# Patient Record
Sex: FEMALE | Race: WHITE | NOT HISPANIC OR LATINO
[De-identification: names, ages, dates, MRNs, and addresses within clinical notes are randomized per-mention and may not be internally consistent; named-entity substitution may affect disease eponyms.]

---

## 2018-07-27 PROBLEM — Z00.00 ENCOUNTER FOR PREVENTIVE HEALTH EXAMINATION: Status: ACTIVE | Noted: 2018-07-27

## 2018-11-02 ENCOUNTER — RECORD ABSTRACTING (OUTPATIENT)
Age: 48
End: 2018-11-02

## 2018-11-02 RX ORDER — CALCIUM CARB/VITAMIN D3/VIT K1 650MG-12.5
500-500-40 TABLET,CHEWABLE ORAL
Refills: 0 | Status: ACTIVE | COMMUNITY

## 2018-11-05 ENCOUNTER — APPOINTMENT (OUTPATIENT)
Dept: ENDOCRINOLOGY | Facility: CLINIC | Age: 48
End: 2018-11-05

## 2019-01-22 ENCOUNTER — MEDICATION RENEWAL (OUTPATIENT)
Age: 49
End: 2019-01-22

## 2019-02-15 ENCOUNTER — CLINICAL ADVICE (OUTPATIENT)
Age: 49
End: 2019-02-15

## 2019-05-10 ENCOUNTER — RX RENEWAL (OUTPATIENT)
Age: 49
End: 2019-05-10

## 2019-06-03 ENCOUNTER — APPOINTMENT (OUTPATIENT)
Dept: ENDOCRINOLOGY | Facility: CLINIC | Age: 49
End: 2019-06-03
Payer: COMMERCIAL

## 2019-06-03 VITALS
DIASTOLIC BLOOD PRESSURE: 80 MMHG | HEART RATE: 65 BPM | SYSTOLIC BLOOD PRESSURE: 132 MMHG | HEIGHT: 60 IN | OXYGEN SATURATION: 98 %

## 2019-06-03 PROCEDURE — 99214 OFFICE O/P EST MOD 30 MIN: CPT

## 2019-06-06 DIAGNOSIS — Z78.0 ASYMPTOMATIC MENOPAUSAL STATE: ICD-10-CM

## 2019-06-06 DIAGNOSIS — Z13.820 ENCOUNTER FOR SCREENING FOR OSTEOPOROSIS: ICD-10-CM

## 2019-06-06 LAB
25(OH)D3 SERPL-MCNC: 19.4 NG/ML
ALBUMIN SERPL ELPH-MCNC: 4.6 G/DL
ALP BLD-CCNC: 92 U/L
ALT SERPL-CCNC: 22 U/L
ANION GAP SERPL CALC-SCNC: 16 MMOL/L
AST SERPL-CCNC: 22 U/L
BASOPHILS # BLD AUTO: 0.06 K/UL
BASOPHILS NFR BLD AUTO: 0.8 %
BILIRUB SERPL-MCNC: 0.4 MG/DL
BUN SERPL-MCNC: 20 MG/DL
CALCIUM SERPL-MCNC: 9.9 MG/DL
CHLORIDE SERPL-SCNC: 105 MMOL/L
CHOLEST SERPL-MCNC: 235 MG/DL
CHOLEST/HDLC SERPL: 3.3 RATIO
CO2 SERPL-SCNC: 21 MMOL/L
CORTIS SERPL-MCNC: 5.2 UG/DL
CREAT SERPL-MCNC: 1 MG/DL
EOSINOPHIL # BLD AUTO: 0.15 K/UL
EOSINOPHIL NFR BLD AUTO: 2 %
ESTIMATED AVERAGE GLUCOSE: 111 MG/DL
ESTRADIOL SERPL-MCNC: 23 PG/ML
FSH SERPL-MCNC: 104 IU/L
GH SERPL-MCNC: 0.07 NG/ML
GLUCOSE SERPL-MCNC: 82 MG/DL
HBA1C MFR BLD HPLC: 5.5 %
HCT VFR BLD CALC: 47.6 %
HDLC SERPL-MCNC: 71 MG/DL
HGB BLD-MCNC: 15.3 G/DL
IGF-1 INTERP: NORMAL
IGF-I BLD-MCNC: 153 NG/ML
IMM GRANULOCYTES NFR BLD AUTO: 0.3 %
LDLC SERPL CALC-MCNC: 149 MG/DL
LH SERPL-ACNC: 78.7 IU/L
LYMPHOCYTES # BLD AUTO: 2.34 K/UL
LYMPHOCYTES NFR BLD AUTO: 31.4 %
MAN DIFF?: NORMAL
MCHC RBC-ENTMCNC: 28.7 PG
MCHC RBC-ENTMCNC: 32.1 GM/DL
MCV RBC AUTO: 89.1 FL
MONOCYTES # BLD AUTO: 0.72 K/UL
MONOCYTES NFR BLD AUTO: 9.7 %
NEUTROPHILS # BLD AUTO: 4.16 K/UL
NEUTROPHILS NFR BLD AUTO: 55.8 %
PLATELET # BLD AUTO: 316 K/UL
POTASSIUM SERPL-SCNC: 4.5 MMOL/L
PROT SERPL-MCNC: 7.7 G/DL
RBC # BLD: 5.34 M/UL
RBC # FLD: 14.7 %
SHBG SERPL-SCNC: 40 NMOL/L
SODIUM SERPL-SCNC: 142 MMOL/L
T4 FREE SERPL-MCNC: 1.5 NG/DL
TRIGL SERPL-MCNC: 76 MG/DL
TSH SERPL-ACNC: 0.79 UIU/ML
VIT B12 SERPL-MCNC: 494 PG/ML
WBC # FLD AUTO: 7.45 K/UL

## 2019-06-07 LAB
17OHP SERPL-MCNC: 15 NG/DL
TESTOST BND SERPL-MCNC: 1.3 PG/ML
TESTOST SERPL-MCNC: 31.9 NG/DL

## 2019-06-07 NOTE — PHYSICAL EXAM
[No Acute Distress] : no acute distress [Alert] : alert [Normal Sclera/Conjunctiva] : normal sclera/conjunctiva [Well Nourished] : well nourished [Well Developed] : well developed [EOMI] : extra ocular movement intact [No Proptosis] : no proptosis [Thyroid Not Enlarged] : the thyroid was not enlarged [No Thyroid Nodules] : there were no palpable thyroid nodules [No Respiratory Distress] : no respiratory distress [No Accessory Muscle Use] : no accessory muscle use [Normal S1, S2] : normal S1 and S2 [Normal Rate] : heart rate was normal  [Clear to Auscultation] : lungs were clear to auscultation bilaterally [Pedal Pulses Normal] : the pedal pulses are present [Regular Rhythm] : with a regular rhythm [No Edema] : there was no peripheral edema [Anterior Cervical Nodes] : anterior cervical nodes [Post Cervical Nodes] : posterior cervical nodes [Normal] : normal and non tender [No Spinal Tenderness] : no spinal tenderness [Spine Straight] : spine straight [No Stigmata of Cushings Syndrome] : no stigmata of cushings syndrome [Normal Gait] : normal gait [Normal Strength/Tone] : muscle strength and tone were normal [No Rash] : no rash [Normal Reflexes] : deep tendon reflexes were 2+ and symmetric [Acanthosis Nigricans] : no acanthosis nigricans [No Tremors] : no tremors [Oriented x3] : oriented to person, place, and time

## 2019-06-07 NOTE — ASSESSMENT
[FreeTextEntry1] : Hypothryodism - will cont RX  check labs\par \par \par ?menopausal pt is s/p partial hysterecotmy \par  check LH FSH E2\par Pt with H/o PCOS\par  \par weight- resuemd exrecising \par s/p gstric sleeve in past \par \par pneumonia- to follow up with PMD

## 2019-06-07 NOTE — HISTORY OF PRESENT ILLNESS
[FreeTextEntry1] : Pt is here for follow up hypothryoidsm  and h/o PCOS \par  has been overall ok  but  a lot of family stress \par \par recently had pneumonia - to follow up with PMD for "spots on her lung" tomoroow \par \par HAs been having occaisonal hot flashes daily but not all day \par \par concrened about weight gain- started exercisign yesterday

## 2019-10-03 LAB — DHEA-SULFATE, SERUM: 234 UG/DL

## 2020-04-20 ENCOUNTER — APPOINTMENT (OUTPATIENT)
Dept: ENDOCRINOLOGY | Facility: CLINIC | Age: 50
End: 2020-04-20
Payer: COMMERCIAL

## 2020-04-20 PROCEDURE — 99213 OFFICE O/P EST LOW 20 MIN: CPT | Mod: 95

## 2020-04-26 NOTE — ASSESSMENT
[FreeTextEntry1] : Hypothryodism - will cont RX  check labs and sono \par \par \par ?menopausal pt is s/p partial hysterecotmy \par  check LH FSH E2\par Pt with H/o PCOS\par neverr id Dexa \par  \par weight- will go back to exercsising \par s/p gstric sleeve in past \par

## 2020-04-26 NOTE — REASON FOR VISIT
[Follow - Up] : a follow-up visit [Follow-Up: _____] : a [unfilled] follow-up visit [Hypothyroidism] : hypothyroidism

## 2020-04-26 NOTE — HISTORY OF PRESENT ILLNESS
[Home] : at home, [unfilled] , at the time of the visit. [Patient] : the patient [Other Location: e.g. Home (Enter Location, City,State)___] : at [unfilled] [FreeTextEntry1] : Esthela started 343 pm \par  time ended 355 pm \par Pt is here for follow up hypothryoidsm  and h/o PCOS \par  has been overall ok  but  a lot of family stress \par \par pt repotrs to be feeling overall ok \par  trying to stay healthy - in\par \par HAs been having occaisonal hot flashes daily but not all day \par \par  [Self] : self

## 2020-04-30 ENCOUNTER — RX RENEWAL (OUTPATIENT)
Age: 50
End: 2020-04-30

## 2020-10-19 ENCOUNTER — APPOINTMENT (OUTPATIENT)
Dept: ENDOCRINOLOGY | Facility: CLINIC | Age: 50
End: 2020-10-19
Payer: COMMERCIAL

## 2020-10-19 VITALS
OXYGEN SATURATION: 99 % | SYSTOLIC BLOOD PRESSURE: 120 MMHG | HEART RATE: 66 BPM | HEIGHT: 60 IN | DIASTOLIC BLOOD PRESSURE: 98 MMHG

## 2020-10-19 PROCEDURE — 99214 OFFICE O/P EST MOD 30 MIN: CPT | Mod: 25

## 2020-10-19 PROCEDURE — 99072 ADDL SUPL MATRL&STAF TM PHE: CPT

## 2020-10-25 NOTE — PHYSICAL EXAM
[Alert] : alert [Well Nourished] : well nourished [No Acute Distress] : no acute distress [Well Developed] : well developed [Normal Sclera/Conjunctiva] : normal sclera/conjunctiva [EOMI] : extra ocular movement intact [Normal Oropharynx] : the oropharynx was normal [Thyroid Not Enlarged] : the thyroid was not enlarged [No Thyroid Nodules] : no palpable thyroid nodules [No Respiratory Distress] : no respiratory distress [No Accessory Muscle Use] : no accessory muscle use [Clear to Auscultation] : lungs were clear to auscultation bilaterally [Normal S1, S2] : normal S1 and S2 [Normal Rate] : heart rate was normal [Regular Rhythm] : with a regular rhythm [No Edema] : no peripheral edema [Pedal Pulses Normal] : the pedal pulses are present [Normal Anterior Cervical Nodes] : no anterior cervical lymphadenopathy [Normal Posterior Cervical Nodes] : no posterior cervical lymphadenopathy [No Stigmata of Cushings Syndrome] : no stigmata of Cushings Syndrome [Normal Gait] : normal gait [Normal Strength/Tone] : muscle strength and tone were normal [No Rash] : no rash [Acanthosis Nigricans] : no acanthosis nigricans [Normal Reflexes] : deep tendon reflexes were 2+ and symmetric [No Tremors] : no tremors [Oriented x3] : oriented to person, place, and time

## 2020-10-25 NOTE — ASSESSMENT
[FreeTextEntry1] : Hypothryodism - will cont RX  check labs and sono \par \par \par menopausal pt is s/p partial hysterecotmy \par  check LH FSH E2\par Pt with H/o PCOS\par neverr id Dexa \par  \par + HTN - repeat 130/96 see PMD  pt had been havign some HA  in [place]   not smnoring a lot -  will call P/mD for followup \par \par

## 2020-10-25 NOTE — HISTORY OF PRESENT ILLNESS
[FreeTextEntry1] : \par Pt is here for follow up hypothryoidsm  and h/o PCOS \par  has been overall ok  but  a lot of family stress \par \par pt repotrs to be feeling overall ok \par  trying to stay healthy - in\par \par HAs been having occaisonal hot flashes daily but not all day   Pt has been trying to watch diet \par  but BP high today - ate salty  ffods  yesterday \par \par

## 2021-04-12 ENCOUNTER — APPOINTMENT (OUTPATIENT)
Dept: ENDOCRINOLOGY | Facility: CLINIC | Age: 51
End: 2021-04-12
Payer: COMMERCIAL

## 2021-04-12 VITALS
HEART RATE: 70 BPM | SYSTOLIC BLOOD PRESSURE: 130 MMHG | OXYGEN SATURATION: 99 % | DIASTOLIC BLOOD PRESSURE: 80 MMHG | HEIGHT: 60 IN

## 2021-04-12 PROCEDURE — 99213 OFFICE O/P EST LOW 20 MIN: CPT

## 2021-04-12 PROCEDURE — 99072 ADDL SUPL MATRL&STAF TM PHE: CPT

## 2021-04-19 NOTE — HISTORY OF PRESENT ILLNESS
[FreeTextEntry1] : \par Pt is here for follow up hypothryoidsm  and h/o PCOS \par \par pt repotrs to be feeling overall ok \par  trying to stay healthy - in\par \par HAs been having occaisonal hot flashes daily but not all day   Pt has been trying to watch diet \par  but BP high today - ate salty  ffods  yesterday \par \par

## 2021-04-19 NOTE — PHYSICAL EXAM
[Alert] : alert [Well Nourished] : well nourished [No Acute Distress] : no acute distress [Well Developed] : well developed [Normal Sclera/Conjunctiva] : normal sclera/conjunctiva [EOMI] : extra ocular movement intact [Normal Oropharynx] : the oropharynx was normal [Thyroid Not Enlarged] : the thyroid was not enlarged [No Thyroid Nodules] : no palpable thyroid nodules [No Respiratory Distress] : no respiratory distress [No Accessory Muscle Use] : no accessory muscle use [Clear to Auscultation] : lungs were clear to auscultation bilaterally [Normal S1, S2] : normal S1 and S2 [Normal Rate] : heart rate was normal [Regular Rhythm] : with a regular rhythm [No Edema] : no peripheral edema [Pedal Pulses Normal] : the pedal pulses are present [Normal Anterior Cervical Nodes] : no anterior cervical lymphadenopathy [No Stigmata of Cushings Syndrome] : no stigmata of Cushings Syndrome [Normal Gait] : normal gait [Normal Strength/Tone] : muscle strength and tone were normal [No Rash] : no rash [Acanthosis Nigricans] : no acanthosis nigricans [Normal Reflexes] : deep tendon reflexes were 2+ and symmetric [No Tremors] : no tremors [Oriented x3] : oriented to person, place, and time

## 2021-04-19 NOTE — ASSESSMENT
[FreeTextEntry1] : Hypothryodism - will cont RX  check labs and sono \par \par \par menopausal pt is s/p partial hysterecotmy \par  check LH FSH E2\par Pt with H/o PCOS\par \par \par BP better today on HCTZ

## 2021-05-14 ENCOUNTER — APPOINTMENT (OUTPATIENT)
Dept: ENDOCRINOLOGY | Facility: CLINIC | Age: 51
End: 2021-05-14

## 2021-05-19 ENCOUNTER — APPOINTMENT (OUTPATIENT)
Dept: ENDOCRINOLOGY | Facility: CLINIC | Age: 51
End: 2021-05-19
Payer: COMMERCIAL

## 2021-05-19 DIAGNOSIS — D50.8 OTHER IRON DEFICIENCY ANEMIAS: ICD-10-CM

## 2021-05-19 PROCEDURE — 36415 COLL VENOUS BLD VENIPUNCTURE: CPT

## 2021-05-19 PROCEDURE — 99072 ADDL SUPL MATRL&STAF TM PHE: CPT

## 2021-06-02 LAB
ANION GAP SERPL CALC-SCNC: 16 MMOL/L
BUN SERPL-MCNC: 16 MG/DL
CALCIUM SERPL-MCNC: 9.5 MG/DL
CALCIUM SERPL-MCNC: 9.8 MG/DL
CHLORIDE SERPL-SCNC: 106 MMOL/L
CO2 SERPL-SCNC: 20 MMOL/L
CORTIS SERPL-MCNC: 16.7 UG/DL
CREAT SERPL-MCNC: 0.83 MG/DL
GLUCOSE SERPL-MCNC: 96 MG/DL
PARATHYROID HORMONE INTACT: 45 PG/ML
POTASSIUM SERPL-SCNC: 4.6 MMOL/L
SODIUM SERPL-SCNC: 142 MMOL/L
T3FREE SERPL-MCNC: 2.71 PG/ML
T4 FREE SERPL-MCNC: 1.5 NG/DL
TSH SERPL-ACNC: 1.48 UIU/ML

## 2021-10-22 ENCOUNTER — APPOINTMENT (OUTPATIENT)
Dept: ENDOCRINOLOGY | Facility: CLINIC | Age: 51
End: 2021-10-22
Payer: COMMERCIAL

## 2021-10-22 VITALS
HEIGHT: 60 IN | HEART RATE: 79 BPM | SYSTOLIC BLOOD PRESSURE: 130 MMHG | WEIGHT: 221 LBS | BODY MASS INDEX: 43.39 KG/M2 | DIASTOLIC BLOOD PRESSURE: 80 MMHG

## 2021-10-22 PROCEDURE — 99214 OFFICE O/P EST MOD 30 MIN: CPT

## 2021-10-22 RX ORDER — BLOOD PRESSURE TEST KIT
KIT MISCELLANEOUS
Qty: 1 | Refills: 0 | Status: DISCONTINUED | COMMUNITY
Start: 2020-10-19 | End: 2021-10-22

## 2021-10-22 NOTE — ASSESSMENT
[FreeTextEntry1] : Hypothryodism - will cont RX  check labs and sono \par \par \par menopausal pt is s/p partial hysterecotmy \par \par PCOS  OBesity \par will check abs  dw pt GLP!RA to help with eright issues  states her brother wa on it an d lost a lot of weight willsnd RX ozempic to [facility]  pt needs to come back for  pen teach \par  NO h/po pnacreatitis  no / h/o MTC in family \par \par \par COVId vax will dw pmd pt had covid last year \par  pt with lot of snesitivities \par \par BP better today  off HCTZ

## 2021-10-22 NOTE — PHYSICAL EXAM
[Alert] : alert [Well Nourished] : well nourished [No Acute Distress] : no acute distress [Well Developed] : well developed [Normal Sclera/Conjunctiva] : normal sclera/conjunctiva [EOMI] : extra ocular movement intact [Normal Oropharynx] : the oropharynx was normal [Thyroid Not Enlarged] : the thyroid was not enlarged [No Thyroid Nodules] : no palpable thyroid nodules [No Respiratory Distress] : no respiratory distress [No Accessory Muscle Use] : no accessory muscle use [Clear to Auscultation] : lungs were clear to auscultation bilaterally [Normal S1, S2] : normal S1 and S2 [Normal Rate] : heart rate was normal [Regular Rhythm] : with a regular rhythm [No Edema] : no peripheral edema [Pedal Pulses Normal] : the pedal pulses are present [Normal Anterior Cervical Nodes] : no anterior cervical lymphadenopathy [No Stigmata of Cushings Syndrome] : no stigmata of Cushings Syndrome [Normal Gait] : normal gait [Normal Strength/Tone] : muscle strength and tone were normal [No Rash] : no rash [Normal Reflexes] : deep tendon reflexes were 2+ and symmetric [No Tremors] : no tremors [Oriented x3] : oriented to person, place, and time [Acanthosis Nigricans] : no acanthosis nigricans

## 2022-01-25 ENCOUNTER — APPOINTMENT (OUTPATIENT)
Dept: ENDOCRINOLOGY | Facility: CLINIC | Age: 52
End: 2022-01-25
Payer: COMMERCIAL

## 2022-01-25 VITALS
BODY MASS INDEX: 43.39 KG/M2 | SYSTOLIC BLOOD PRESSURE: 130 MMHG | HEIGHT: 60 IN | DIASTOLIC BLOOD PRESSURE: 90 MMHG | HEART RATE: 53 BPM | OXYGEN SATURATION: 95 % | WEIGHT: 221 LBS

## 2022-01-25 PROCEDURE — 99214 OFFICE O/P EST MOD 30 MIN: CPT

## 2022-02-01 NOTE — HISTORY OF PRESENT ILLNESS
[FreeTextEntry1] : \par Pt is here for follow up hypothryoidsm  and h/o PCOS \par \par pt repotrs to be feeling overall ok \par  been trying to lose weight \par  did have Covid again \par  not vaccianted \par \par  \par \par

## 2022-02-01 NOTE — ASSESSMENT
[FreeTextEntry1] : Hypothryodism - will cont RX  check labs and sono \par \par \par menopausal pt is s/p partial hysterecotmy \par \par PCOS  OBesity \par can ry ozempic 0.25 mg qweek\par  NO h/po pnacreatitis  no / h/o MTC in family \par \par \par COVId vax will dw pmd pt had covid last year \par  pt with lot of snesitivities \par \par BP higher today -will seePMD  recheck 122/88- willrestart HCTTZ

## 2022-04-12 ENCOUNTER — APPOINTMENT (OUTPATIENT)
Dept: ENDOCRINOLOGY | Facility: CLINIC | Age: 52
End: 2022-04-12

## 2022-05-20 ENCOUNTER — APPOINTMENT (OUTPATIENT)
Dept: ENDOCRINOLOGY | Facility: CLINIC | Age: 52
End: 2022-05-20
Payer: COMMERCIAL

## 2022-05-20 VITALS
WEIGHT: 221 LBS | HEART RATE: 56 BPM | OXYGEN SATURATION: 99 % | HEIGHT: 60 IN | BODY MASS INDEX: 43.39 KG/M2 | DIASTOLIC BLOOD PRESSURE: 82 MMHG | SYSTOLIC BLOOD PRESSURE: 142 MMHG

## 2022-05-20 PROCEDURE — 99214 OFFICE O/P EST MOD 30 MIN: CPT

## 2022-05-22 NOTE — HISTORY OF PRESENT ILLNESS
[FreeTextEntry1] : \par Pt is here for follow up hypothryoidsm  and h/o PCOS \par \par pt repotrs to be feeling overall ok \par  been trying to lose weight \par  but was unable to get Ozempic \par tryied MFN only for 1 week and did not see weight loss so stopped also pillwas very large \par  \par \par  \par \par

## 2022-05-22 NOTE — ASSESSMENT
Discussed condition and exacerbating conditions/situations (e.g., dry/arid environments, overhead fans, air conditioners, side effect of medications). [FreeTextEntry1] : Hypothryodism - will cont RX  check labs and sono \par \par \par menopausal pt is s/p partial hysterecotmy \par \par PCOS  OBesity \par can try MFN  plain 500mg with dinner \par \par \par \par BP higher today -will seePMD  recheck 122/88-see PMD

## 2022-09-13 ENCOUNTER — NON-APPOINTMENT (OUTPATIENT)
Age: 52
End: 2022-09-13

## 2022-09-13 RX ORDER — SEMAGLUTIDE 1.34 MG/ML
2 INJECTION, SOLUTION SUBCUTANEOUS
Qty: 4 | Refills: 1 | Status: DISCONTINUED | COMMUNITY
Start: 2021-10-22 | End: 2022-09-13

## 2022-09-13 RX ORDER — PEN NEEDLE, DIABETIC 32GX 5/32"
32G X 4 MM NEEDLE, DISPOSABLE MISCELLANEOUS
Qty: 20 | Refills: 1 | Status: DISCONTINUED | COMMUNITY
Start: 2021-10-22 | End: 2022-09-13

## 2022-09-13 RX ORDER — METFORMIN ER 500 MG 500 MG/1
500 TABLET ORAL DAILY
Qty: 90 | Refills: 1 | Status: DISCONTINUED | COMMUNITY
Start: 2022-02-09 | End: 2022-09-13

## 2022-09-13 RX ORDER — METFORMIN HYDROCHLORIDE 500 MG/1
500 TABLET, COATED ORAL
Qty: 90 | Refills: 1 | Status: DISCONTINUED | COMMUNITY
Start: 2022-05-20 | End: 2022-09-13

## 2022-11-17 LAB — TSH SERPL-ACNC: 0.14

## 2022-11-18 ENCOUNTER — APPOINTMENT (OUTPATIENT)
Dept: ENDOCRINOLOGY | Facility: CLINIC | Age: 52
End: 2022-11-18

## 2023-01-19 ENCOUNTER — RX RENEWAL (OUTPATIENT)
Age: 53
End: 2023-01-19

## 2023-03-20 ENCOUNTER — RX RENEWAL (OUTPATIENT)
Age: 53
End: 2023-03-20

## 2023-04-10 RX ORDER — ORAL SEMAGLUTIDE 3 MG/1
3 TABLET ORAL
Qty: 90 | Refills: 0 | Status: DISCONTINUED | COMMUNITY
Start: 2022-09-13 | End: 2023-04-10

## 2023-04-13 ENCOUNTER — RX RENEWAL (OUTPATIENT)
Age: 53
End: 2023-04-13

## 2023-04-20 ENCOUNTER — NON-APPOINTMENT (OUTPATIENT)
Age: 53
End: 2023-04-20

## 2023-05-01 ENCOUNTER — APPOINTMENT (OUTPATIENT)
Dept: ENDOCRINOLOGY | Facility: CLINIC | Age: 53
End: 2023-05-01
Payer: COMMERCIAL

## 2023-05-01 VITALS
BODY MASS INDEX: 43.39 KG/M2 | SYSTOLIC BLOOD PRESSURE: 120 MMHG | WEIGHT: 221 LBS | DIASTOLIC BLOOD PRESSURE: 88 MMHG | HEART RATE: 77 BPM | OXYGEN SATURATION: 96 % | HEIGHT: 60 IN

## 2023-05-01 PROCEDURE — 98960 EDU&TRN PT SELF-MGMT NQHP 1: CPT

## 2023-05-01 PROCEDURE — 99214 OFFICE O/P EST MOD 30 MIN: CPT | Mod: 25

## 2023-05-01 NOTE — ASSESSMENT
[FreeTextEntry1] : Hypothryodism - will cont RX  check labs and sono \par \par \par menopausal pt is s/p partial hysterecotmy \par \par PCOS  OBesity \par RN t o tach wegovy now \par  will resume 0.25 mg qweek\par  and see how she does \par in 4 weeks\par  check updayted labs\par \par \par \par BP higher today -will seePMD take HCTX 2x per week-

## 2023-05-01 NOTE — HISTORY OF PRESENT ILLNESS
[FreeTextEntry1] : \par Pt is here for follow up hypothryoidsm  and h/o PCOS \par \par pt repotrs to be feeling overall ok \par snacking toomuch \par  tried wegovy with daugther teaching her -  1st time pen did nto inject until after she withdrew needle \par 2nd tiem could not do it on her own  \par tryied MFN only for 1 week and did not see weight loss so stopped bc had nightmares\par  never tried rybelsus \par  \par \par  \par \par

## 2023-07-12 LAB — TSH SERPL-ACNC: 7.76

## 2023-07-13 ENCOUNTER — APPOINTMENT (OUTPATIENT)
Dept: ENDOCRINOLOGY | Facility: CLINIC | Age: 53
End: 2023-07-13
Payer: COMMERCIAL

## 2023-07-13 VITALS
DIASTOLIC BLOOD PRESSURE: 80 MMHG | HEIGHT: 60 IN | SYSTOLIC BLOOD PRESSURE: 125 MMHG | OXYGEN SATURATION: 98 % | HEART RATE: 70 BPM

## 2023-07-13 PROCEDURE — 99214 OFFICE O/P EST MOD 30 MIN: CPT

## 2023-07-13 NOTE — HISTORY OF PRESENT ILLNESS
[FreeTextEntry1] : Hypothyroidism\par \par Current TFTs\par Need updated TFTs \par Last TSH 7.760\par \par Current regimen\par Levothyroxine 125 mcg daily\par Patient advised to take every day in the morning, on an empty stomach, and with no other medications. \par \par **has history of palpitations/overactive thyroid on small dose increase to 137 mcg**\par \par MNG-heterogenous gland no nodules-2023 \par \par PCOS/Obesity /History of predm\par -Now on wegovy 0.25 mg weekly . No negative effects \par (has plans for a cruise but days of dosing would be effected) \par - Feels that clothes are fitting better but not ready to get on scale ---states she is down two pant sizes\par \par Vit D Def\par -on vit d3 QOD supplement \par Normal bone density

## 2023-07-13 NOTE — REVIEW OF SYSTEMS
[Fatigue] : no fatigue [Recent Weight Gain (___ Lbs)] : no recent weight gain [Recent Weight Loss (___ Lbs)] : no recent weight loss [Visual Field Defect] : no visual field defect [Blurred Vision] : no blurred vision [Dysphagia] : no dysphagia [Neck Pain] : no neck pain [Dysphonia] : no dysphonia [Chest Pain] : no chest pain [Shortness Of Breath] : no shortness of breath [Constipation] : no constipation [Diarrhea] : no diarrhea [Polyuria] : no polyuria [Depression] : no depression [Polydipsia] : no polydipsia

## 2023-07-13 NOTE — ASSESSMENT
[FreeTextEntry1] : Hypothyroidism\par -Continue current dose of lt4 but labs done today in office, may need dose change aware pt is very sensitive to dose changes)\par \par No need for repeat thyroid sono at this time\par \par PCOS/PreDM/Obesity\par -Likely pt is no longer predm due to weight loss (unsure exact amount, not ready to get on scale) \par -Pt also will be ready for wegovy dose increase when national shortage is improved \par -HGA1C done in office today\par \par Vit D Def\par -Continue daily supplement (takes qod), need updated levels with labs\par \par Labs done today in office - will call with results\par RTO 3 months

## 2023-07-14 LAB
25(OH)D3 SERPL-MCNC: 27.8 NG/ML
ALBUMIN SERPL ELPH-MCNC: 4.7 G/DL
ALP BLD-CCNC: 97 U/L
ALT SERPL-CCNC: 21 U/L
ANION GAP SERPL CALC-SCNC: 14 MMOL/L
AST SERPL-CCNC: 19 U/L
BILIRUB SERPL-MCNC: 0.5 MG/DL
BUN SERPL-MCNC: 20 MG/DL
CALCIUM SERPL-MCNC: 10 MG/DL
CHLORIDE SERPL-SCNC: 105 MMOL/L
CO2 SERPL-SCNC: 23 MMOL/L
CREAT SERPL-MCNC: 1.04 MG/DL
EGFR: 65 ML/MIN/1.73M2
ESTIMATED AVERAGE GLUCOSE: 117 MG/DL
GLUCOSE SERPL-MCNC: 87 MG/DL
HBA1C MFR BLD HPLC: 5.7 %
POTASSIUM SERPL-SCNC: 5.1 MMOL/L
PROT SERPL-MCNC: 7.2 G/DL
SODIUM SERPL-SCNC: 143 MMOL/L
T3FREE SERPL-MCNC: 2.58 PG/ML
T4 FREE SERPL-MCNC: 1.7 NG/DL
TSH SERPL-ACNC: 0.32 UIU/ML

## 2023-07-17 ENCOUNTER — NON-APPOINTMENT (OUTPATIENT)
Age: 53
End: 2023-07-17

## 2023-07-19 ENCOUNTER — NON-APPOINTMENT (OUTPATIENT)
Age: 53
End: 2023-07-19

## 2023-08-17 ENCOUNTER — NON-APPOINTMENT (OUTPATIENT)
Age: 53
End: 2023-08-17

## 2023-08-20 ENCOUNTER — RX RENEWAL (OUTPATIENT)
Age: 53
End: 2023-08-20

## 2023-08-28 ENCOUNTER — RX RENEWAL (OUTPATIENT)
Age: 53
End: 2023-08-28

## 2023-11-02 LAB
HBA1C MFR BLD HPLC: 5.6
TSH SERPL-ACNC: 1.8

## 2023-11-03 ENCOUNTER — APPOINTMENT (OUTPATIENT)
Dept: ENDOCRINOLOGY | Facility: CLINIC | Age: 53
End: 2023-11-03
Payer: COMMERCIAL

## 2023-11-03 VITALS
WEIGHT: 208 LBS | DIASTOLIC BLOOD PRESSURE: 88 MMHG | HEART RATE: 78 BPM | BODY MASS INDEX: 40.84 KG/M2 | SYSTOLIC BLOOD PRESSURE: 136 MMHG | OXYGEN SATURATION: 95 % | HEIGHT: 60 IN

## 2023-11-03 PROCEDURE — 99214 OFFICE O/P EST MOD 30 MIN: CPT

## 2024-02-23 ENCOUNTER — RX RENEWAL (OUTPATIENT)
Age: 54
End: 2024-02-23

## 2024-02-23 RX ORDER — HYDROCHLOROTHIAZIDE 12.5 MG/1
12.5 TABLET ORAL DAILY
Qty: 21 | Refills: 5 | Status: ACTIVE | COMMUNITY
Start: 2020-10-19 | End: 1900-01-01

## 2024-02-28 ENCOUNTER — RX RENEWAL (OUTPATIENT)
Age: 54
End: 2024-02-28

## 2024-03-07 ENCOUNTER — APPOINTMENT (OUTPATIENT)
Dept: ENDOCRINOLOGY | Facility: CLINIC | Age: 54
End: 2024-03-07

## 2024-05-08 ENCOUNTER — APPOINTMENT (OUTPATIENT)
Dept: ENDOCRINOLOGY | Facility: CLINIC | Age: 54
End: 2024-05-08
Payer: COMMERCIAL

## 2024-05-08 VITALS
SYSTOLIC BLOOD PRESSURE: 120 MMHG | DIASTOLIC BLOOD PRESSURE: 84 MMHG | WEIGHT: 183 LBS | HEIGHT: 60 IN | BODY MASS INDEX: 35.93 KG/M2 | HEART RATE: 74 BPM | OXYGEN SATURATION: 96 %

## 2024-05-08 DIAGNOSIS — E28.2 POLYCYSTIC OVARIAN SYNDROME: ICD-10-CM

## 2024-05-08 DIAGNOSIS — E66.9 OBESITY, UNSPECIFIED: ICD-10-CM

## 2024-05-08 DIAGNOSIS — E03.8 OTHER SPECIFIED HYPOTHYROIDISM: ICD-10-CM

## 2024-05-08 DIAGNOSIS — R73.03 PREDIABETES.: ICD-10-CM

## 2024-05-08 DIAGNOSIS — I10 ESSENTIAL (PRIMARY) HYPERTENSION: ICD-10-CM

## 2024-05-08 DIAGNOSIS — E55.9 VITAMIN D DEFICIENCY, UNSPECIFIED: ICD-10-CM

## 2024-05-08 PROCEDURE — 99214 OFFICE O/P EST MOD 30 MIN: CPT

## 2024-05-08 PROCEDURE — G2211 COMPLEX E/M VISIT ADD ON: CPT | Mod: NC,1L

## 2024-05-08 RX ORDER — SEMAGLUTIDE 0.25 MG/.5ML
0.25 INJECTION, SOLUTION SUBCUTANEOUS
Qty: 1 | Refills: 0 | Status: DISCONTINUED | COMMUNITY
Start: 2023-04-10 | End: 2024-05-08

## 2024-05-08 NOTE — ASSESSMENT
[FreeTextEntry1] : Hypothyroidism -Continue current dose of lt4 but need updated TFTs asap  No need for repeat thyroid sono at this time  PCOS/PreDM/Obesity -Need updated hga1c and insulin leves -Pt on zepboud from weight loss clinic but unsure when/where she will get her next dose from due to national shortage -Weight loss clinic also considering MFN but i would like to see hga1c and insulin levels first  Vit D Def -Continue daily supplement (takes qod), need updated levels with labs  Fasting labs to be done asap, will call wit results RTO 3 months

## 2024-05-08 NOTE — HISTORY OF PRESENT ILLNESS
[FreeTextEntry1] : Interval history: , mom, and dad have been sick/had surgery. Feeling great  Fall 2023 - hasnt had wegovy since  Winter/Spring 2024- has started zepbound- currently on 5 mg dose. Hoping to go up to 7.5 mg or 10 mg dose but due to severe national shortage unable to find . This is through weight loss doctor.  **weight loss since nov is 25 lbs **  Hypothyroidism  Current TFTs Need updated TFTs   Current regimen Levothyroxine 150 mcg daily Patient advised to take every day in the morning, on an empty stomach, and with no other medications.   **has history of palpitations/overactive thyroid on small dose increase to 137 mcg**  MNG-heterogenous gland no nodules-2023   Vit D Def -on vit d3 QOD supplement  Normal bone density

## 2024-05-08 NOTE — REVIEW OF SYSTEMS
[Decreased Appetite] : decreased appetite [Recent Weight Loss (___ Lbs)] : recent weight loss: [unfilled] lbs [Fatigue] : no fatigue [Recent Weight Gain (___ Lbs)] : no recent weight gain [Visual Field Defect] : no visual field defect [Blurred Vision] : no blurred vision [Dysphagia] : no dysphagia [Neck Pain] : no neck pain [Dysphonia] : no dysphonia [Chest Pain] : no chest pain [Shortness Of Breath] : no shortness of breath [Constipation] : no constipation [Diarrhea] : no diarrhea [Polyuria] : no polyuria [Polydipsia] : no polydipsia

## 2024-05-15 RX ORDER — LEVOTHYROXINE SODIUM 0.12 MG/1
125 TABLET ORAL
Qty: 90 | Refills: 0 | Status: ACTIVE | COMMUNITY
Start: 2020-04-30 | End: 1900-01-01

## 2024-05-26 ENCOUNTER — RX RENEWAL (OUTPATIENT)
Age: 54
End: 2024-05-26

## 2024-05-26 RX ORDER — LEVOTHYROXINE SODIUM 0.15 MG/1
150 TABLET ORAL
Qty: 90 | Refills: 1 | Status: ACTIVE | COMMUNITY
Start: 2024-05-26 | End: 1900-01-01

## 2024-06-25 DIAGNOSIS — E06.3 AUTOIMMUNE THYROIDITIS: ICD-10-CM

## 2024-08-12 DIAGNOSIS — R73.03 PREDIABETES.: ICD-10-CM

## 2024-08-12 DIAGNOSIS — E03.8 OTHER SPECIFIED HYPOTHYROIDISM: ICD-10-CM

## 2024-08-12 DIAGNOSIS — E55.9 VITAMIN D DEFICIENCY, UNSPECIFIED: ICD-10-CM

## 2024-09-11 LAB
HBA1C MFR BLD HPLC: 5.5
LDLC SERPL DIRECT ASSAY-MCNC: 136
TSH SERPL-ACNC: <0.01

## 2024-09-12 ENCOUNTER — APPOINTMENT (OUTPATIENT)
Dept: ENDOCRINOLOGY | Facility: CLINIC | Age: 54
End: 2024-09-12
Payer: COMMERCIAL

## 2024-09-12 VITALS
HEIGHT: 60 IN | WEIGHT: 163 LBS | HEART RATE: 98 BPM | DIASTOLIC BLOOD PRESSURE: 74 MMHG | BODY MASS INDEX: 32 KG/M2 | SYSTOLIC BLOOD PRESSURE: 122 MMHG | OXYGEN SATURATION: 98 %

## 2024-09-12 DIAGNOSIS — R73.03 PREDIABETES.: ICD-10-CM

## 2024-09-12 DIAGNOSIS — E03.8 OTHER SPECIFIED HYPOTHYROIDISM: ICD-10-CM

## 2024-09-12 DIAGNOSIS — E06.3 AUTOIMMUNE THYROIDITIS: ICD-10-CM

## 2024-09-12 DIAGNOSIS — E55.9 VITAMIN D DEFICIENCY, UNSPECIFIED: ICD-10-CM

## 2024-09-12 DIAGNOSIS — E66.9 OBESITY, UNSPECIFIED: ICD-10-CM

## 2024-09-12 DIAGNOSIS — E28.2 POLYCYSTIC OVARIAN SYNDROME: ICD-10-CM

## 2024-09-12 PROCEDURE — G2211 COMPLEX E/M VISIT ADD ON: CPT | Mod: NC

## 2024-09-12 PROCEDURE — 99214 OFFICE O/P EST MOD 30 MIN: CPT

## 2024-09-12 NOTE — HISTORY OF PRESENT ILLNESS
[FreeTextEntry1] : Interval history: Pt feeling great, happy with 40 lbs weight loss   Obesity Now down 40 lbs Zepbound 10 mg through weight loss clinic  Hypothyroidism Current TFTs Need updated TFTs  Current regimen Levothyroxine 125 mcg daily Patient advised to take every day in the morning, on an empty stomach, and with no other medications.   MNG-heterogenous gland no nodules-2024  Vit D Def -on MTV Normal bone density   No PREDM / normal insulin levels on last labs

## 2024-09-12 NOTE — REVIEW OF SYSTEMS
[Fatigue] : no fatigue [Decreased Appetite] : decreased appetite [Recent Weight Gain (___ Lbs)] : no recent weight gain [Recent Weight Loss (___ Lbs)] : recent weight loss: [unfilled] lbs [Visual Field Defect] : no visual field defect [Blurred Vision] : no blurred vision [Dysphagia] : no dysphagia [Neck Pain] : no neck pain [Dysphonia] : no dysphonia [Chest Pain] : no chest pain [Shortness Of Breath] : no shortness of breath [Constipation] : no constipation [Diarrhea] : no diarrhea [Polyuria] : no polyuria [Polydipsia] : no polydipsia

## 2024-09-12 NOTE — ASSESSMENT
[FreeTextEntry1] : Hypothyroidism -Continue current dose of lt4 but need updated TFTs asap  No need for repeat thyroid sono at this time  PCOS/PreDM/Obesity -Pt on zepboud from weight loss clinic and doing excellent  Vit D Def -Continue MTV, need updated levels with labs  Fasting labs to be done asap, will call wit results Will determine frequency of follow up when labs result

## 2024-12-09 LAB — TSH SERPL-ACNC: 0.23

## 2024-12-10 ENCOUNTER — APPOINTMENT (OUTPATIENT)
Dept: ENDOCRINOLOGY | Facility: CLINIC | Age: 54
End: 2024-12-10
Payer: COMMERCIAL

## 2024-12-10 ENCOUNTER — NON-APPOINTMENT (OUTPATIENT)
Age: 54
End: 2024-12-10

## 2024-12-10 VITALS
SYSTOLIC BLOOD PRESSURE: 142 MMHG | HEIGHT: 60 IN | BODY MASS INDEX: 29.45 KG/M2 | WEIGHT: 150 LBS | DIASTOLIC BLOOD PRESSURE: 88 MMHG | HEART RATE: 72 BPM | OXYGEN SATURATION: 98 %

## 2024-12-10 DIAGNOSIS — R73.03 PREDIABETES.: ICD-10-CM

## 2024-12-10 DIAGNOSIS — E03.8 OTHER SPECIFIED HYPOTHYROIDISM: ICD-10-CM

## 2024-12-10 DIAGNOSIS — I10 ESSENTIAL (PRIMARY) HYPERTENSION: ICD-10-CM

## 2024-12-10 DIAGNOSIS — E06.3 AUTOIMMUNE THYROIDITIS: ICD-10-CM

## 2024-12-10 DIAGNOSIS — E28.2 POLYCYSTIC OVARIAN SYNDROME: ICD-10-CM

## 2024-12-10 DIAGNOSIS — E66.9 OBESITY, UNSPECIFIED: ICD-10-CM

## 2024-12-10 DIAGNOSIS — E55.9 VITAMIN D DEFICIENCY, UNSPECIFIED: ICD-10-CM

## 2024-12-10 PROCEDURE — 99214 OFFICE O/P EST MOD 30 MIN: CPT

## 2024-12-10 PROCEDURE — G2211 COMPLEX E/M VISIT ADD ON: CPT | Mod: NC

## 2024-12-10 RX ORDER — TIRZEPATIDE 5 MG/.5ML
5 INJECTION, SOLUTION SUBCUTANEOUS
Qty: 6 | Refills: 0 | Status: ACTIVE | COMMUNITY
Start: 2024-12-10 | End: 1900-01-01

## 2024-12-12 DIAGNOSIS — E78.5 HYPERLIPIDEMIA, UNSPECIFIED: ICD-10-CM

## 2024-12-18 ENCOUNTER — APPOINTMENT (OUTPATIENT)
Dept: CARDIOLOGY | Facility: CLINIC | Age: 54
End: 2024-12-18

## 2025-03-07 LAB
HBA1C MFR BLD HPLC: 5.4
LDLC SERPL DIRECT ASSAY-MCNC: 128
TSH SERPL-ACNC: 0.45

## 2025-03-10 ENCOUNTER — APPOINTMENT (OUTPATIENT)
Dept: ENDOCRINOLOGY | Facility: CLINIC | Age: 55
End: 2025-03-10
Payer: COMMERCIAL

## 2025-03-10 ENCOUNTER — NON-APPOINTMENT (OUTPATIENT)
Age: 55
End: 2025-03-10

## 2025-03-10 VITALS
DIASTOLIC BLOOD PRESSURE: 80 MMHG | BODY MASS INDEX: 27.29 KG/M2 | WEIGHT: 139 LBS | SYSTOLIC BLOOD PRESSURE: 125 MMHG | HEART RATE: 70 BPM | HEIGHT: 60 IN | OXYGEN SATURATION: 99 %

## 2025-03-10 DIAGNOSIS — E55.9 VITAMIN D DEFICIENCY, UNSPECIFIED: ICD-10-CM

## 2025-03-10 DIAGNOSIS — E03.8 OTHER SPECIFIED HYPOTHYROIDISM: ICD-10-CM

## 2025-03-10 DIAGNOSIS — I10 ESSENTIAL (PRIMARY) HYPERTENSION: ICD-10-CM

## 2025-03-10 DIAGNOSIS — E66.9 OBESITY, UNSPECIFIED: ICD-10-CM

## 2025-03-10 DIAGNOSIS — E78.5 HYPERLIPIDEMIA, UNSPECIFIED: ICD-10-CM

## 2025-03-10 DIAGNOSIS — R73.03 PREDIABETES.: ICD-10-CM

## 2025-03-10 DIAGNOSIS — E06.3 AUTOIMMUNE THYROIDITIS: ICD-10-CM

## 2025-03-10 PROCEDURE — 99214 OFFICE O/P EST MOD 30 MIN: CPT

## 2025-03-10 PROCEDURE — G2211 COMPLEX E/M VISIT ADD ON: CPT | Mod: NC

## 2025-03-25 ENCOUNTER — NON-APPOINTMENT (OUTPATIENT)
Age: 55
End: 2025-03-25

## 2025-03-25 ENCOUNTER — APPOINTMENT (OUTPATIENT)
Dept: CARDIOLOGY | Facility: CLINIC | Age: 55
End: 2025-03-25
Payer: COMMERCIAL

## 2025-03-25 VITALS
TEMPERATURE: 97.8 F | SYSTOLIC BLOOD PRESSURE: 156 MMHG | DIASTOLIC BLOOD PRESSURE: 84 MMHG | HEIGHT: 60 IN | WEIGHT: 139 LBS | RESPIRATION RATE: 16 BRPM | BODY MASS INDEX: 27.29 KG/M2 | OXYGEN SATURATION: 100 % | HEART RATE: 79 BPM

## 2025-03-25 DIAGNOSIS — E78.5 HYPERLIPIDEMIA, UNSPECIFIED: ICD-10-CM

## 2025-03-25 DIAGNOSIS — I10 ESSENTIAL (PRIMARY) HYPERTENSION: ICD-10-CM

## 2025-03-25 PROCEDURE — G2211 COMPLEX E/M VISIT ADD ON: CPT | Mod: NC

## 2025-03-25 PROCEDURE — 99204 OFFICE O/P NEW MOD 45 MIN: CPT

## 2025-03-25 PROCEDURE — 93000 ELECTROCARDIOGRAM COMPLETE: CPT

## 2025-06-09 ENCOUNTER — APPOINTMENT (OUTPATIENT)
Dept: ENDOCRINOLOGY | Facility: CLINIC | Age: 55
End: 2025-06-09
Payer: COMMERCIAL

## 2025-06-09 VITALS
OXYGEN SATURATION: 98 % | SYSTOLIC BLOOD PRESSURE: 124 MMHG | BODY MASS INDEX: 26.9 KG/M2 | HEIGHT: 60 IN | HEART RATE: 62 BPM | WEIGHT: 137 LBS | DIASTOLIC BLOOD PRESSURE: 80 MMHG

## 2025-06-09 LAB
HBA1C MFR BLD HPLC: 5.4
LDLC SERPL DIRECT ASSAY-MCNC: 130
TSH SERPL-ACNC: 7.48

## 2025-06-09 PROCEDURE — G2211 COMPLEX E/M VISIT ADD ON: CPT | Mod: NC

## 2025-06-09 PROCEDURE — 99214 OFFICE O/P EST MOD 30 MIN: CPT
